# Patient Record
Sex: MALE | URBAN - METROPOLITAN AREA
[De-identification: names, ages, dates, MRNs, and addresses within clinical notes are randomized per-mention and may not be internally consistent; named-entity substitution may affect disease eponyms.]

---

## 2018-11-15 ENCOUNTER — RADIANT APPOINTMENT (OUTPATIENT)
Dept: GENERAL RADIOLOGY | Facility: CLINIC | Age: 28
End: 2018-11-15
Attending: PREVENTIVE MEDICINE
Payer: COMMERCIAL

## 2018-11-15 ENCOUNTER — OFFICE VISIT (OUTPATIENT)
Dept: ORTHOPEDICS | Facility: CLINIC | Age: 28
End: 2018-11-15
Payer: COMMERCIAL

## 2018-11-15 VITALS — HEIGHT: 74 IN | WEIGHT: 190 LBS | BODY MASS INDEX: 24.38 KG/M2

## 2018-11-15 DIAGNOSIS — M54.50 LOW BACK PAIN: ICD-10-CM

## 2018-11-15 DIAGNOSIS — G89.29 CHRONIC LOW BACK PAIN, UNSPECIFIED BACK PAIN LATERALITY, WITH SCIATICA PRESENCE UNSPECIFIED: Primary | ICD-10-CM

## 2018-11-15 DIAGNOSIS — M54.5 CHRONIC LOW BACK PAIN, UNSPECIFIED BACK PAIN LATERALITY, WITH SCIATICA PRESENCE UNSPECIFIED: Primary | ICD-10-CM

## 2018-11-15 NOTE — LETTER
11/15/2018       RE: Lonnie Lazo  [address] [facility] [address]     Dear Colleague,    Thank you for referring your patient, Lonnie Lazo, to the [facility]TH[facility] CLINIC at [facility]. Please see a copy of my visit note below.          SPORTS & ORTHOPEDIC WALK-IN VISIT 11/15/2018    Primary Care Physician: Cristina  2 wks ago doing squats in the gym, felt some LBP. Now more localized in back of L leg.15-20 years ago hurt back after falling 2 stories.     Reason for visit:     What part of your body is injured / painful?  left low back    What caused the injury /pain? Squatting    How long ago did your injury occur or pain begin? several weeks ago    What are your most bothersome symptoms? Pain    How would you characterize your symptom?  aching, sharp and shooting    What makes your symptoms better? Rest and Other: Lidocaine patches    What makes your symptoms worse? Sitting    Have you been previously seen for this problem? No    Medical History:    Any recent changes to your medical history? No    Any new medication prescribed since last visit? No    Have you had surgery on this body part before? No    Social History:    Occupation: Grad Student    Handedness: Right    Exercise: Most days/week     Patient seen and examined, agree with above. Dr bull      HISTORY OF PRESENT ILLNESS  Mr. Lazo is a pleasant 28 year old year old male who presents to clinic today with low back pain  Lonnie explains that he exercises and has pain in low back and into legs at times  Location: low back  Quality:  achy pain    Severity: 4/10 at worst    Duration: months  Timing: occurs intermittently  Context: occurs while exercising and lifting  Modifying factors:  resting and non-use makes it better, movement and use makes it worse  Associated signs & symptoms: radiation into buttocks    Additional history: as documented    MEDICAL HISTORY  There is no problem list on  "file for this patient.      No current outpatient medications on file.       Allergies   Allergen Reactions     Penicillins        No family history on file.    Additional medical/Social/Surgical histories reviewed in [facility] and updated as appropriate.     PHYSICAL EXAM  Vitals:    11/15/18 1801   Weight: 86.2 kg (190 lb)   Height: 1.88 m (6' 2\")   Vital Signs: Ht 1.88 m (6' 2\")   Wt 86.2 kg (190 lb)   BMI 24.39 kg/m    Patient declined being weighed. Body mass index is 24.39 kg/m .    General  - normal appearance, in no obvious distress  CV  - normal peripheral perfusion  Pulm  - normal respiratory pattern, non-labored  Musculoskeletal - lumbar spine  - stance: normal gait without limp, no obvious leg length discrepancy, normal heel and toe walk  - inspection: normal bone and joint alignment, no obvious scoliosis  - palpation: no paravertebral or bony tenderness  - ROM: flexion exacerbates pain, normal extension, sidebending, rotation  - strength: lower extremities 5/5 in all planes  - special tests:  (+) straight leg raise  (+) slump test  Neuro  - patellar and Achilles DTRs 2+ bilaterally, lower extremity sensory deficit throughout L5 distribution, grossly normal coordination, normal muscle tone  Skin  - no ecchymosis, erythema, warmth, or induration, no obvious rash  Psych  - interactive, appropriate, normal mood and affect    ASSESSMENT & PLAN  27 yo male with lumbar pain with radicular symptoms  Reviewed xrays lumbar: shows some disc space narrowing  Tylenol and nsaids PRN  Given HEP  F/u in 1-2 months if continues      Dean Smith MD, CAQSM    "

## 2018-11-15 NOTE — PROGRESS NOTES
SPORTS & ORTHOPEDIC WALK-IN VISIT 11/15/2018    Primary Care Physician:   LBP w/ radiculopathy down L leg.    Reason for visit:     What part of your body is injured / painful?  {:139797} low back    What caused the injury /pain? {DS INJURY CAUSE:809743}    How long ago did your injury occur or pain begin? {DOI:834204}    What are your most bothersome symptoms? {SYMPTOMS:175497}    How would you characterize your symptom?  {PAIN ASSESSMENT:772110}    What makes your symptoms better? {SX BETTER:398047}    What makes your symptoms worse? {SX WORSE:492345}    Have you been previously seen for this problem? {YES/NO:837619}    Medical History:    Any recent changes to your medical history? {YES/NO:022690}    Any new medication prescribed since last visit? {YES/NO:956215}    Have you had surgery on this body part before? {YES/NO SX:802754}    Social History:    Occupation: ***    Handedness: {HANDDOMINANCE:089396}    Exercise: {EXERCISE TYPE:573484}    Review of Systems:    Do you have fever, chills, weight loss? {YES/NO:814817}    Do you have any vision problems? {YES/NO:298726}    Do you have any chest pain or edema? {YES/NO:252698}    Do you have any shortness of breath or wheezing?  {YES/NO:206179}    Do you have stomach problems? {YES/NO:846603}    Do you have any numbness or focal weakness? {YES/NO:081761}    Do you have diabetes? {YES/NO:913234}    Do you have problems with bleeding or clotting? {YES/NO:694926}    Do you have an rashes or other skin lesions? {YES/NO:003059}

## 2018-11-15 NOTE — PROGRESS NOTES
SPORTS & ORTHOPEDIC WALK-IN VISIT 11/15/2018    Primary Care Physician: Cristina  2 wks ago doing squats in the gym, felt some LBP. Now more localized in back of L leg.15-20 years ago hurt back after falling 2 stories.     Reason for visit:     What part of your body is injured / painful?  left low back    What caused the injury /pain? Squatting    How long ago did your injury occur or pain begin? several weeks ago    What are your most bothersome symptoms? Pain    How would you characterize your symptom?  aching, sharp and shooting    What makes your symptoms better? Rest and Other: Lidocaine patches    What makes your symptoms worse? Sitting    Have you been previously seen for this problem? No    Medical History:    Any recent changes to your medical history? No    Any new medication prescribed since last visit? No    Have you had surgery on this body part before? No    Social History:    Occupation: Grad Student    Handedness: Right    Exercise: Most days/week    Review of Systems:    Do you have fever, chills, weight loss? No    Do you have any vision problems? No    Do you have any chest pain or edema? No    Do you have any shortness of breath or wheezing?  No    Do you have stomach problems? No    Do you have any numbness or focal weakness? No    Do you have diabetes? No    Do you have problems with bleeding or clotting? No    Do you have an rashes or other skin lesions? No     Patient seen and examined, agree with above. Dr bull

## 2018-11-19 ENCOUNTER — TRANSFERRED RECORDS (OUTPATIENT)
Dept: HEALTH INFORMATION MANAGEMENT | Facility: CLINIC | Age: 28
End: 2018-11-19

## 2018-11-19 ENCOUNTER — MEDICAL CORRESPONDENCE (OUTPATIENT)
Dept: HEALTH INFORMATION MANAGEMENT | Facility: CLINIC | Age: 28
End: 2018-11-19

## 2018-12-15 NOTE — PROGRESS NOTES
"HISTORY OF PRESENT ILLNESS  Mr. Lazo is a pleasant 28 year old year old male who presents to clinic today with low back pain  [place] explains that he exercises and has pain in low back and into legs at times  Location: low back  Quality:  achy pain    Severity: 4/10 at worst    Duration: months  Timing: occurs intermittently  Context: occurs while exercising and lifting  Modifying factors:  resting and non-use makes it better, movement and use makes it worse  Associated signs & symptoms: radiation into buttocks    Additional history: as documented    MEDICAL HISTORY  There is no problem list on file for this patient.      No current outpatient medications on file.       Allergies   Allergen Reactions     Penicillins        No family history on file.    Additional medical/Social/Surgical histories reviewed in [employer] and updated as appropriate.     REVIEW OF SYSTEMS (12/15/2018)  10 point ROS of systems including Constitutional, Eyes, Respiratory, Cardiovascular, Gastroenterology, Genitourinary, Integumentary, Musculoskeletal, Psychiatric were all negative except for pertinent positives noted in my HPI.     PHYSICAL EXAM  Vitals:    11/15/18 1801   Weight: 86.2 kg (190 lb)   Height: 1.88 m (6' 2\")   Vital Signs: Ht 1.88 m (6' 2\")   Wt 86.2 kg (190 lb)   BMI 24.39 kg/m   Patient declined being weighed. Body mass index is 24.39 kg/m .    General  - normal appearance, in no obvious distress  CV  - normal peripheral perfusion  Pulm  - normal respiratory pattern, non-labored  Musculoskeletal - lumbar spine  - stance: normal gait without limp, no obvious leg length discrepancy, normal heel and toe walk  - inspection: normal bone and joint alignment, no obvious scoliosis  - palpation: no paravertebral or bony tenderness  - ROM: flexion exacerbates pain, normal extension, sidebending, rotation  - strength: lower extremities 5/5 in all planes  - special tests:  (+) straight leg raise  (+) slump test  Neuro  - patellar and " Achilles DTRs 2+ bilaterally, lower extremity sensory deficit throughout L5 distribution, grossly normal coordination, normal muscle tone  Skin  - no ecchymosis, erythema, warmth, or induration, no obvious rash  Psych  - interactive, appropriate, normal mood and affect    ASSESSMENT & PLAN  29 yo male with lumbar pain with radicular symptoms  Reviewed xrays lumbar: shows some disc space narrowing  Tylenol and nsaids PRN  Given HEP  F/u in 1-2 months if continues      Dean Smith MD, CAQSM

## 2018-12-21 ENCOUNTER — TELEPHONE (OUTPATIENT)
Dept: SURGERY | Facility: CLINIC | Age: 28
End: 2018-12-21

## 2018-12-21 NOTE — TELEPHONE ENCOUNTER
Wait list: Attempt to call patient to offer sooner appointment with Celine Sousa NP. Number we have is disconnected. Found different number on referral, [phone], phone went straight to ,  not set up to leave message.    Rolo MARTIN LPN

## 2018-12-28 ENCOUNTER — TELEPHONE (OUTPATIENT)
Dept: SURGERY | Facility: CLINIC | Age: 28
End: 2018-12-28

## 2018-12-28 NOTE — TELEPHONE ENCOUNTER
Attempt to call patient to confirm appt with Celine Sousa NP on 12/31/18 @ 1215pm. Number invalid, unable to leave message.    Rolo MARTIN LPN

## 2021-08-20 DIAGNOSIS — K51.90 COLITIS, ULCERATIVE (H): Primary | ICD-10-CM

## 2022-03-11 ENCOUNTER — TRANSCRIBE ORDERS (OUTPATIENT)
Dept: LAB | Facility: CLINIC | Age: 32
End: 2022-03-11

## 2022-03-11 DIAGNOSIS — K51.90 ULCERATIVE COLITIS (H): Primary | ICD-10-CM
